# Patient Record
Sex: FEMALE | Race: WHITE | NOT HISPANIC OR LATINO | URBAN - METROPOLITAN AREA
[De-identification: names, ages, dates, MRNs, and addresses within clinical notes are randomized per-mention and may not be internally consistent; named-entity substitution may affect disease eponyms.]

---

## 2017-08-25 ENCOUNTER — IMPORTED ENCOUNTER (OUTPATIENT)
Dept: URBAN - METROPOLITAN AREA CLINIC 50 | Facility: CLINIC | Age: 77
End: 2017-08-25

## 2017-08-28 ENCOUNTER — IMPORTED ENCOUNTER (OUTPATIENT)
Dept: URBAN - METROPOLITAN AREA CLINIC 50 | Facility: CLINIC | Age: 77
End: 2017-08-28

## 2017-10-03 ENCOUNTER — IMPORTED ENCOUNTER (OUTPATIENT)
Dept: URBAN - METROPOLITAN AREA CLINIC 50 | Facility: CLINIC | Age: 77
End: 2017-10-03

## 2017-10-19 ENCOUNTER — IMPORTED ENCOUNTER (OUTPATIENT)
Dept: URBAN - METROPOLITAN AREA CLINIC 50 | Facility: CLINIC | Age: 77
End: 2017-10-19

## 2018-09-10 ENCOUNTER — IMPORTED ENCOUNTER (OUTPATIENT)
Dept: URBAN - METROPOLITAN AREA CLINIC 50 | Facility: CLINIC | Age: 78
End: 2018-09-10

## 2019-09-16 ENCOUNTER — IMPORTED ENCOUNTER (OUTPATIENT)
Dept: URBAN - METROPOLITAN AREA CLINIC 50 | Facility: CLINIC | Age: 79
End: 2019-09-16

## 2019-10-22 ENCOUNTER — IMPORTED ENCOUNTER (OUTPATIENT)
Dept: URBAN - METROPOLITAN AREA CLINIC 50 | Facility: CLINIC | Age: 79
End: 2019-10-22

## 2019-11-12 ENCOUNTER — IMPORTED ENCOUNTER (OUTPATIENT)
Dept: URBAN - METROPOLITAN AREA CLINIC 50 | Facility: CLINIC | Age: 79
End: 2019-11-12

## 2020-10-01 ENCOUNTER — IMPORTED ENCOUNTER (OUTPATIENT)
Dept: URBAN - METROPOLITAN AREA CLINIC 50 | Facility: CLINIC | Age: 80
End: 2020-10-01

## 2020-12-11 ENCOUNTER — IMPORTED ENCOUNTER (OUTPATIENT)
Dept: URBAN - METROPOLITAN AREA CLINIC 50 | Facility: CLINIC | Age: 80
End: 2020-12-11

## 2020-12-15 ENCOUNTER — IMPORTED ENCOUNTER (OUTPATIENT)
Dept: URBAN - METROPOLITAN AREA CLINIC 50 | Facility: CLINIC | Age: 80
End: 2020-12-15

## 2020-12-15 NOTE — PATIENT DISCUSSION
"""Patient given final contact lens prescription. 12/15/2020"" ""Reviewed proper use and contact lens care. Discussed possible risks associated with contact lens wear.  Educated patient no sleeping

## 2021-04-17 ASSESSMENT — VISUAL ACUITY
OS_CC: 20/20
OD_CC: J1+@ 18 IN
OD_OTHER: 20/50. 20/70.
OS_CC: J1+
OS_BAT: 20/80
OS_CC: J1+/-
OD_OTHER: 20/70. 20/80.
OS_CC: 20/25
OS_CC: 20/25
OD_CC: 20/25+1
OS_CC: 20/20
OD_OTHER: 20/30. 20/30.
OS_OTHER: 20/80. 20/100.
OS_OTHER: 20/30-2. 20/50.
OD_CC: 20/20
OD_BAT: 20/70
OD_CC: J1+
OD_CC: 20/25
OS_CC: J1+
OD_BAT: 20/50
OS_CC: 20/20
OS_CC: J1+@ 18 IN
OD_CC: J1+
OD_CC: J1
OD_CC: 20/30
OD_CC: J1+/-
OS_CC: J1
OS_BAT: 20/30-2
OD_CC: 20/30+1
OS_BAT: 20/50
OD_BAT: 20/30
OS_OTHER: 20/50. 20/80.

## 2021-04-17 ASSESSMENT — TONOMETRY
OS_IOP_MMHG: 16
OD_IOP_MMHG: 17
OD_IOP_MMHG: 20
OD_IOP_MMHG: 16
OS_IOP_MMHG: 24
OD_IOP_MMHG: 23
OS_IOP_MMHG: 20
OS_IOP_MMHG: 18

## 2021-05-11 NOTE — PATIENT DISCUSSION
Possible reoccurence/conservative original, YAG capsulotomy if Dr Ketty Gallegos feels there is an opportunity for improved vision.

## 2021-10-28 ENCOUNTER — PREPPED CHART (OUTPATIENT)
Dept: URBAN - METROPOLITAN AREA CLINIC 49 | Facility: CLINIC | Age: 81
End: 2021-10-28

## 2021-11-05 ENCOUNTER — CATARACT EVALUATION (OUTPATIENT)
Dept: URBAN - METROPOLITAN AREA CLINIC 49 | Facility: CLINIC | Age: 81
End: 2021-11-05

## 2021-11-05 DIAGNOSIS — H18.513: ICD-10-CM

## 2021-11-05 DIAGNOSIS — H35.3131: ICD-10-CM

## 2021-11-05 DIAGNOSIS — H25.13: ICD-10-CM

## 2021-11-05 PROCEDURE — 92014 COMPRE OPH EXAM EST PT 1/>: CPT

## 2021-11-05 PROCEDURE — 92134 CPTRZ OPH DX IMG PST SGM RTA: CPT

## 2021-11-05 ASSESSMENT — TONOMETRY
OS_IOP_MMHG: 20
OD_IOP_MMHG: 20

## 2021-11-05 ASSESSMENT — VISUAL ACUITY
OD_CC: 20/30-1
OS_CC: 20/25-1
OS_GLARE: 20/20-1
OD_GLARE: 20/25-1
OS_GLARE: 20/20-1
OU_CC: J1+
OD_GLARE: 20/25

## 2022-01-21 ENCOUNTER — DIAGNOSTICS ONLY (OUTPATIENT)
Dept: URBAN - METROPOLITAN AREA CLINIC 49 | Facility: CLINIC | Age: 82
End: 2022-01-21

## 2022-01-21 DIAGNOSIS — H25.13: ICD-10-CM

## 2022-01-21 DIAGNOSIS — H35.3131: ICD-10-CM

## 2022-01-21 PROCEDURE — 92136 OPHTHALMIC BIOMETRY: CPT

## 2022-01-21 PROCEDURE — 92025IOL CORNEAL TOPOGRAPHY PREMIUM IOL

## 2022-01-21 PROCEDURE — 92134 CPTRZ OPH DX IMG PST SGM RTA: CPT

## 2022-01-21 ASSESSMENT — KERATOMETRY
OS_AXISANGLE2_DEGREES: 17
OD_K1POWER_DIOPTERS: 44.12
OD_K2POWER_DIOPTERS: 42.50
OD_AXISANGLE2_DEGREES: 165
OD_AXISANGLE_DEGREES: 075
OS_AXISANGLE_DEGREES: 107
OS_K2POWER_DIOPTERS: 42.00
OS_K1POWER_DIOPTERS: 44.00

## 2022-02-18 ENCOUNTER — PRE-OP/H&P (OUTPATIENT)
Dept: URBAN - METROPOLITAN AREA CLINIC 49 | Facility: CLINIC | Age: 82
End: 2022-02-18

## 2022-02-18 DIAGNOSIS — H25.11: ICD-10-CM

## 2022-02-18 PROCEDURE — PREOP PRE OP VISIT

## 2022-02-18 ASSESSMENT — KERATOMETRY
OD_AXISANGLE2_DEGREES: 165
OS_K2POWER_DIOPTERS: 42.00
OD_K1POWER_DIOPTERS: 44.12
OS_AXISANGLE2_DEGREES: 17
OS_AXISANGLE_DEGREES: 107
OD_AXISANGLE_DEGREES: 075
OS_K1POWER_DIOPTERS: 44.00
OD_K2POWER_DIOPTERS: 42.50

## 2022-02-18 ASSESSMENT — VISUAL ACUITY
OS_CC: 20/30
OD_PH: 20/25-2
OD_CC: 20/30
OS_PH: 20/25

## 2022-02-18 ASSESSMENT — TONOMETRY
OD_IOP_MMHG: 21
OS_IOP_MMHG: 21

## 2022-02-18 NOTE — PATIENT DISCUSSION
CATARACT SURGERY PLANNER - TORIC IOL/+FEMTO: Phacoemulsification with IOL: Eye: right|DOS: 2/23/22|Model: [id number]|Power: 24|Target: dist|Femto: yes|Axis: 075°|Visc: duet|Omidria: yes|10% Phenylephrine: no|Epi-shugarcaine: yes|Phaco Setting: dense|BSS+: no|Trypan Blue: no|CTR: no|Olive Tip: no|Atropine: no|Pupilloplasty: no|Notes: ARMD, [place].

## 2022-02-23 ENCOUNTER — POST-OP (OUTPATIENT)
Dept: URBAN - METROPOLITAN AREA CLINIC 48 | Facility: CLINIC | Age: 82
End: 2022-02-23

## 2022-02-23 ENCOUNTER — SURGERY/PROCEDURE (OUTPATIENT)
Dept: URBAN - METROPOLITAN AREA SURGERY 16 | Facility: SURGERY | Age: 82
End: 2022-02-23

## 2022-02-23 DIAGNOSIS — H25.11: ICD-10-CM

## 2022-02-23 DIAGNOSIS — Z96.1: ICD-10-CM

## 2022-02-23 DIAGNOSIS — Z98.41: ICD-10-CM

## 2022-02-23 PROCEDURE — 99024 POSTOP FOLLOW-UP VISIT: CPT

## 2022-02-23 PROCEDURE — 66984 XCAPSL CTRC RMVL W/O ECP: CPT

## 2022-02-23 ASSESSMENT — KERATOMETRY
OS_AXISANGLE_DEGREES: 107
OD_AXISANGLE_DEGREES: 075
OD_K2POWER_DIOPTERS: 42.50
OS_AXISANGLE2_DEGREES: 17
OS_AXISANGLE_DEGREES: 107
OS_K1POWER_DIOPTERS: 44.00
OS_K2POWER_DIOPTERS: 42.00
OS_AXISANGLE2_DEGREES: 17
OD_AXISANGLE2_DEGREES: 165
OS_K1POWER_DIOPTERS: 44.00
OD_AXISANGLE2_DEGREES: 165
OD_AXISANGLE_DEGREES: 075
OD_K2POWER_DIOPTERS: 42.50
OD_K1POWER_DIOPTERS: 44.12
OD_K1POWER_DIOPTERS: 44.12
OS_K2POWER_DIOPTERS: 42.00

## 2022-02-23 ASSESSMENT — TONOMETRY
OD_IOP_MMHG: 36
OD_IOP_MMHG: 10

## 2022-02-23 ASSESSMENT — VISUAL ACUITY: OD_SC: 20/100

## 2022-03-04 ENCOUNTER — POST OP/EVAL OF SECOND EYE (OUTPATIENT)
Dept: URBAN - METROPOLITAN AREA CLINIC 49 | Facility: CLINIC | Age: 82
End: 2022-03-04

## 2022-03-04 DIAGNOSIS — H25.12: ICD-10-CM

## 2022-03-04 PROCEDURE — 92136 - 2N OPHTHALMIC BIOMETRY BY PARTIAL COHERENCE INTERFEROMETRY WITH INTRAOCULAR LENS POWER CALCULATION

## 2022-03-04 PROCEDURE — PREOP PRE OP VISIT

## 2022-03-04 ASSESSMENT — VISUAL ACUITY
OD_SC: 20/30+1
OS_CC: 20/30
OD_SC: J1+

## 2022-03-04 ASSESSMENT — TONOMETRY
OS_IOP_MMHG: 21
OD_IOP_MMHG: 21

## 2022-03-04 ASSESSMENT — KERATOMETRY
OD_K2POWER_DIOPTERS: 42.50
OS_AXISANGLE_DEGREES: 107
OD_K1POWER_DIOPTERS: 44.12
OD_AXISANGLE2_DEGREES: 165
OD_AXISANGLE_DEGREES: 075
OS_K1POWER_DIOPTERS: 44.00
OS_K2POWER_DIOPTERS: 42.00
OS_AXISANGLE2_DEGREES: 17

## 2022-03-04 NOTE — PATIENT DISCUSSION
CATARACT SURGERY PLANNER - TORIC IOL/+FEMTO: Phacoemulsification with IOL: Eye: OS|DOS: 03/09/2022|Model: [id number]|Power: 24. 0|Target: mf|Femto: yes|Arcs: / @ / ; / @ Yareli Mayer: 105|Visc: duet|Omidria: yes|10% Phenylephrine: no|Epi-shugarcaine: yes|Phaco Setting: dense|BSS+: no|Trypan Blue: no|CTR: no|Olive Tip: no|Atropine: no|Pupilloplasty: no|Notes: Mild ARMD.

## 2022-03-08 ASSESSMENT — KERATOMETRY
OS_AXISANGLE_DEGREES: 107
OD_K2POWER_DIOPTERS: 42.50
OD_AXISANGLE_DEGREES: 075
OS_K1POWER_DIOPTERS: 44.00
OS_K2POWER_DIOPTERS: 42.00
OS_AXISANGLE2_DEGREES: 17
OD_AXISANGLE2_DEGREES: 165
OD_K1POWER_DIOPTERS: 44.12

## 2022-03-09 ENCOUNTER — POST-OP (OUTPATIENT)
Dept: URBAN - METROPOLITAN AREA CLINIC 48 | Facility: CLINIC | Age: 82
End: 2022-03-09

## 2022-03-09 ENCOUNTER — SURGERY/PROCEDURE (OUTPATIENT)
Dept: URBAN - METROPOLITAN AREA SURGERY 16 | Facility: SURGERY | Age: 82
End: 2022-03-09

## 2022-03-09 DIAGNOSIS — H25.12: ICD-10-CM

## 2022-03-09 DIAGNOSIS — H40.051: ICD-10-CM

## 2022-03-09 DIAGNOSIS — Z96.1: ICD-10-CM

## 2022-03-09 DIAGNOSIS — Z98.42: ICD-10-CM

## 2022-03-09 PROCEDURE — 99024 POSTOP FOLLOW-UP VISIT: CPT

## 2022-03-09 PROCEDURE — 66984 XCAPSL CTRC RMVL W/O ECP: CPT

## 2022-03-09 RX ORDER — BRIMONIDINE TARTRATE, TIMOLOL MALEATE 2; 5 MG/ML; MG/ML
1 SOLUTION/ DROPS OPHTHALMIC TWICE A DAY
Start: 2022-03-09

## 2022-03-09 ASSESSMENT — KERATOMETRY
OS_AXISANGLE2_DEGREES: 17
OS_K1POWER_DIOPTERS: 44.00
OS_AXISANGLE_DEGREES: 107
OS_K2POWER_DIOPTERS: 42.00
OD_K1POWER_DIOPTERS: 44.12
OD_AXISANGLE2_DEGREES: 165
OD_AXISANGLE_DEGREES: 075
OD_K2POWER_DIOPTERS: 42.50

## 2022-03-09 ASSESSMENT — TONOMETRY: OS_IOP_MMHG: 10

## 2022-03-09 ASSESSMENT — VISUAL ACUITY: OS_SC: 20/40-1

## 2022-03-25 ENCOUNTER — POST-OP (OUTPATIENT)
Dept: URBAN - METROPOLITAN AREA CLINIC 49 | Facility: CLINIC | Age: 82
End: 2022-03-25

## 2022-03-25 DIAGNOSIS — Z98.41: ICD-10-CM

## 2022-03-25 DIAGNOSIS — Z96.1: ICD-10-CM

## 2022-03-25 DIAGNOSIS — Z98.42: ICD-10-CM

## 2022-03-25 PROCEDURE — 99024 POSTOP FOLLOW-UP VISIT: CPT

## 2022-03-25 ASSESSMENT — KERATOMETRY
OS_K1POWER_DIOPTERS: 44.00
OD_AXISANGLE_DEGREES: 075
OS_AXISANGLE_DEGREES: 107
OD_K2POWER_DIOPTERS: 42.50
OD_AXISANGLE2_DEGREES: 165
OD_K1POWER_DIOPTERS: 44.12
OS_K2POWER_DIOPTERS: 42.00
OS_AXISANGLE2_DEGREES: 17

## 2022-03-25 ASSESSMENT — VISUAL ACUITY
OS_SC: 20/20
OS_SC: J3
OD_SC: 20/20
OS_SC: J3

## 2022-03-25 ASSESSMENT — TONOMETRY
OD_IOP_MMHG: 14
OS_IOP_MMHG: 14

## 2022-04-15 ENCOUNTER — POST-OP (OUTPATIENT)
Dept: URBAN - METROPOLITAN AREA CLINIC 49 | Facility: CLINIC | Age: 82
End: 2022-04-15

## 2022-04-15 DIAGNOSIS — Z96.1: ICD-10-CM

## 2022-04-15 PROCEDURE — 92015 DETERMINE REFRACTIVE STATE: CPT

## 2022-04-15 PROCEDURE — 99024 POSTOP FOLLOW-UP VISIT: CPT

## 2022-04-15 ASSESSMENT — KERATOMETRY
OD_K2POWER_DIOPTERS: 42.50
OS_K1POWER_DIOPTERS: 44.00
OD_AXISANGLE2_DEGREES: 165
OD_K1POWER_DIOPTERS: 44.12
OD_AXISANGLE_DEGREES: 075
OS_K2POWER_DIOPTERS: 42.00
OS_AXISANGLE2_DEGREES: 17
OS_AXISANGLE_DEGREES: 107

## 2022-04-15 ASSESSMENT — VISUAL ACUITY
OU_SC: 20/20
OU_SC: J1+(-2)
OD_SC: 20/20
OS_SC: 20/20

## 2022-04-15 ASSESSMENT — TONOMETRY
OD_IOP_MMHG: 14
OS_IOP_MMHG: 14

## 2022-05-12 NOTE — PATIENT DISCUSSION
Use over the counter drops, like Lastacaft, Pataday, Zaditor, or Alaway once or twice a day as directed on packaging.

## 2022-05-12 NOTE — PATIENT DISCUSSION
Possible reoccurence/conservative original, YAG capsulotomy if Dr Estephanie Garcia feels there is an opportunity for improved vision.

## 2022-12-02 ENCOUNTER — COMPREHENSIVE EXAM (OUTPATIENT)
Dept: URBAN - METROPOLITAN AREA CLINIC 49 | Facility: CLINIC | Age: 82
End: 2022-12-02

## 2022-12-02 PROCEDURE — 92134 CPTRZ OPH DX IMG PST SGM RTA: CPT

## 2022-12-02 PROCEDURE — 92014 COMPRE OPH EXAM EST PT 1/>: CPT

## 2022-12-02 ASSESSMENT — VISUAL ACUITY
OD_GLARE: 20/40
OU_CC: J1+@18"
OU_SC: J2@18"
OS_GLARE: 20/30
OS_GLARE: 20/50
OD_PH: 20/25
OS_SC: 20/25-2
OD_GLARE: 20/25
OD_SC: 20/30-1
OU_SC: 20/25

## 2022-12-02 ASSESSMENT — TONOMETRY
OS_IOP_MMHG: 14
OD_IOP_MMHG: 15

## 2022-12-02 NOTE — PATIENT DISCUSSION
No Protocol on this med.      Requested Prescriptions     Pending Prescriptions Disp Refills   • WARFARIN SODIUM 5 MG Oral Tab [Pharmacy Med Name: Warfarin Sodium Oral Tablet 5 MG] 30 tablet 0     Sig: TAKE ONE TABLET BY MOUTH ONE TIME DAILY IN THE P.M. No edema. Will monitor.

## 2023-12-05 ENCOUNTER — COMPREHENSIVE EXAM (OUTPATIENT)
Dept: URBAN - METROPOLITAN AREA CLINIC 49 | Facility: LOCATION | Age: 83
End: 2023-12-05

## 2023-12-05 DIAGNOSIS — H35.373: ICD-10-CM

## 2023-12-05 DIAGNOSIS — H02.831: ICD-10-CM

## 2023-12-05 DIAGNOSIS — H52.4: ICD-10-CM

## 2023-12-05 DIAGNOSIS — H35.363: ICD-10-CM

## 2023-12-05 DIAGNOSIS — H26.493: ICD-10-CM

## 2023-12-05 DIAGNOSIS — H18.513: ICD-10-CM

## 2023-12-05 DIAGNOSIS — H43.812: ICD-10-CM

## 2023-12-05 DIAGNOSIS — H02.834: ICD-10-CM

## 2023-12-05 PROCEDURE — 92015 DETERMINE REFRACTIVE STATE: CPT

## 2023-12-05 PROCEDURE — 92014 COMPRE OPH EXAM EST PT 1/>: CPT

## 2023-12-05 PROCEDURE — 92134 CPTRZ OPH DX IMG PST SGM RTA: CPT

## 2023-12-05 ASSESSMENT — VISUAL ACUITY
OS_SC: 20/20-2
OD_GLARE: 20/20
OS_GLARE: 20/20
OD_SC: 20/25
OD_GLARE: 20/20
OS_GLARE: 20/20
OU_CC: J1+-1@16"

## 2023-12-05 ASSESSMENT — TONOMETRY
OS_IOP_MMHG: 15
OD_IOP_MMHG: 15

## 2024-12-10 ENCOUNTER — COMPREHENSIVE EXAM (OUTPATIENT)
Age: 84
End: 2024-12-10

## 2024-12-10 DIAGNOSIS — H26.493: ICD-10-CM

## 2024-12-10 DIAGNOSIS — H02.834: ICD-10-CM

## 2024-12-10 DIAGNOSIS — H04.123: ICD-10-CM

## 2024-12-10 DIAGNOSIS — H43.812: ICD-10-CM

## 2024-12-10 DIAGNOSIS — H35.373: ICD-10-CM

## 2024-12-10 DIAGNOSIS — H02.831: ICD-10-CM

## 2024-12-10 PROCEDURE — 99214 OFFICE O/P EST MOD 30 MIN: CPT

## 2024-12-10 PROCEDURE — 92134 CPTRZ OPH DX IMG PST SGM RTA: CPT
